# Patient Record
Sex: FEMALE | Race: WHITE | Employment: UNEMPLOYED | URBAN - METROPOLITAN AREA
[De-identification: names, ages, dates, MRNs, and addresses within clinical notes are randomized per-mention and may not be internally consistent; named-entity substitution may affect disease eponyms.]

---

## 2021-08-25 ENCOUNTER — HOSPITAL ENCOUNTER (OUTPATIENT)
Dept: MAMMOGRAPHY | Age: 36
Discharge: HOME | End: 2021-08-29
Payer: MEDICARE

## 2021-08-25 VITALS — HEIGHT: 70 IN | WEIGHT: 230 LBS | BODY MASS INDEX: 32.93 KG/M2

## 2021-08-25 DIAGNOSIS — Z12.31 VISIT FOR SCREENING MAMMOGRAM: ICD-10-CM

## 2021-08-25 PROCEDURE — 77067 SCR MAMMO BI INCL CAD: CPT

## 2021-08-30 DIAGNOSIS — R92.8 ABNORMAL MAMMOGRAM: Primary | ICD-10-CM

## 2021-09-08 ENCOUNTER — HOSPITAL ENCOUNTER (OUTPATIENT)
Dept: WOMENS IMAGING | Age: 36
Discharge: HOME | End: 2021-09-08
Payer: MEDICARE

## 2021-09-08 ENCOUNTER — HOSPITAL ENCOUNTER (OUTPATIENT)
Dept: ULTRASOUND IMAGING | Age: 36
Discharge: HOME | End: 2021-09-08
Payer: MEDICARE

## 2021-09-08 DIAGNOSIS — R92.8 ABNORMAL MAMMOGRAM: ICD-10-CM

## 2021-09-08 PROCEDURE — 76641 ULTRASOUND BREAST COMPLETE: CPT

## 2021-09-08 PROCEDURE — 77066 DX MAMMO INCL CAD BI: CPT

## 2022-03-31 DIAGNOSIS — Z12.31 ENCOUNTER FOR SCREENING MAMMOGRAM FOR BREAST CANCER: Primary | ICD-10-CM

## 2024-10-03 ENCOUNTER — HOSPITAL ENCOUNTER (EMERGENCY)
Age: 39
Discharge: HOME | End: 2024-10-03
Attending: EMERGENCY MEDICINE

## 2024-10-03 VITALS
SYSTOLIC BLOOD PRESSURE: 131 MMHG | HEIGHT: 70 IN | RESPIRATION RATE: 18 BRPM | TEMPERATURE: 97.8 F | HEART RATE: 84 BPM | WEIGHT: 232 LBS | OXYGEN SATURATION: 98 % | DIASTOLIC BLOOD PRESSURE: 89 MMHG | BODY MASS INDEX: 33.21 KG/M2

## 2024-10-03 DIAGNOSIS — Z32.02 NEGATIVE PREGNANCY TEST: ICD-10-CM

## 2024-10-03 DIAGNOSIS — R68.89 UNINTENTIONAL WEIGHT CHANGE: Primary | ICD-10-CM

## 2024-10-03 LAB
BACTERIA URNS QL MICRO: NORMAL /HPF
BILIRUB UR QL STRIP.AUTO: NEGATIVE
CLARITY UR: CLEAR
COLOR UR: YELLOW
EPI CELLS #/AREA URNS AUTO: 1 /HPF (ref 0–5)
GLUCOSE UR STRIP.AUTO-MCNC: NEGATIVE MG/DL
HCG UR QL: NEGATIVE
HGB UR QL STRIP.AUTO: ABNORMAL
HYALINE CASTS #/AREA URNS AUTO: 0 /LPF (ref 0–8)
KETONES UR STRIP.AUTO-MCNC: NEGATIVE MG/DL
LEUKOCYTE ESTERASE UR QL STRIP.AUTO: NEGATIVE
NITRITE UR QL STRIP.AUTO: NEGATIVE
PH UR STRIP.AUTO: 5.5 [PH] (ref 5–8)
PROT UR STRIP.AUTO-MCNC: NEGATIVE MG/DL
RBC CLUMPS #/AREA URNS AUTO: 1 /HPF (ref 0–4)
SP GR UR STRIP.AUTO: 1.02 (ref 1–1.03)
UA COMPLETE W REFLEX CULTURE PNL UR: ABNORMAL
UA DIPSTICK W REFLEX MICRO PNL UR: YES
URN SPEC COLLECT METH UR: ABNORMAL
UROBILINOGEN UR STRIP-ACNC: 1 E.U./DL
WBC #/AREA URNS AUTO: 4 /HPF (ref 0–5)

## 2024-10-03 PROCEDURE — 99283 EMERGENCY DEPT VISIT LOW MDM: CPT

## 2024-10-03 PROCEDURE — 84703 CHORIONIC GONADOTROPIN ASSAY: CPT

## 2024-10-03 PROCEDURE — 81001 URINALYSIS AUTO W/SCOPE: CPT

## 2024-10-03 ASSESSMENT — ENCOUNTER SYMPTOMS
SHORTNESS OF BREATH: 0
CHEST TIGHTNESS: 0
NAUSEA: 0
VOMITING: 0
ABDOMINAL PAIN: 0
DIARRHEA: 0

## 2024-10-03 ASSESSMENT — PAIN - FUNCTIONAL ASSESSMENT: PAIN_FUNCTIONAL_ASSESSMENT: NONE - DENIES PAIN

## 2024-10-03 ASSESSMENT — LIFESTYLE VARIABLES: HOW OFTEN DO YOU HAVE A DRINK CONTAINING ALCOHOL: NEVER

## 2024-10-03 NOTE — ED PROVIDER NOTES
I have personally performed a face to face diagnostic evaluation on this patient. I have fully participated in the care of this patient I personally saw the patient and performed a substantive portion of the visit including all aspects of the medical decision making.  I have reviewed and agree with all pertinent clinical information including history, physical exam, diagnostic tests, and the plan.      HPI: Katie Barnett presented with small amount of vaginal bleeding has some irregular periods no abdominal pain.  Patient is mostly here for pregnancy test.  Some concern for pregnancy patient's last full menstrual cycle she is not sure when it has been does not have an OB/GYN  Chief Complaint   Patient presents with    Vaginal Bleeding     Pt c/o vaginal bleeding that began last night. Pt states she has irregular periods but is worried she is pregnant due to weight gain in her abdominal area. Pt states she has not taken pregnancy test.       Review of Systems: See VENITA note  Vital Signs: /89   Pulse 84   Temp 97.8 °F (36.6 °C) (Oral)   Resp 18   Ht 1.778 m (5' 10\")   Wt 105.2 kg (232 lb)   SpO2 98%   BMI 33.29 kg/m²     Alert 38 y.o. female who does not appear toxic or acutely ill  HENT: Atraumatic, oral mucosa moist  Neck: Grossly normal ROM  Chest/Lungs: respiratory effort normal   Abdomen: Soft nontender  Musculoskeletal: Grossly normal ROM  Skin: No palor or diaphoresis    Medical Decision Making and Plan:  Pertinent Labs & Imaging studies reviewed. (See VENITA chart for details)  I agree with VENITA assessment and plan.  38-year-old female presents to the ER primarily for pregnancy test she has had some spotty irregular vaginal bleeding began last night she is not sure if it is a period or not.  She is not pregnant.  Urine does not appear infected she does not have any abdominal reproducible pain she does have some unintentional weight gain.  Patient is requesting referral to OB/GYN.  Will give her 1 at

## 2024-10-03 NOTE — ED PROVIDER NOTES
discussed)  None  Discussion with Other Profesionals : None    Social Determinants : Patient has significant healthcare illiteracy    Records Reviewed : Outpatient Notes postpartum exam 7/14/2021 Dr. Costello    CC/HPI Summary, DDx, ED Course, and Reassessment:     Briefly, this is a 38-year-old female accompanied by her male partner who presents to the emergency department with concern of pregnancy.  The patient reports that her abdomen is swollen and she has not yet taken a pregnancy test.  She reports that she did take a pregnancy test 4 months ago and that was negative.  She does have irregular periods, states that she has had mild vaginal bleeding today and her friend who is a nurse told her that she should see OB-states that she called for an appointment despite not taking a pregnancy test and there was a 4 to 5-week wait    The patient is not pregnant.  On exam, she does have an obese abdomen.  Pelvis was palpated, I do not appreciate a palpable fundus.    She is encouraged to follow-up with primary care as well as gynecology if she is having difficulty getting pregnant.    I see nothing that would suggest an acute abdomen at this time. Based on history, physical exam, risk factors, and tests my suspicion for bowel obstruction, incarcerated hernia, acute pancreatitis, intra-abdominal abscess, perforated viscus, diverticulitis, cholecystitis, appendicitis, PID, ovarian torsion, ectopic pregnancy and tubo-ovarian abscess is very low. There is no evidence of peritonitis, sepsis or toxicity at this time. I feel the patient can be managed as an outpatient with follow-up with her family doctor in 24-48 hours. Instructions have been given for the patient to return to the ED for worsening of the pain, high fevers, intractable vomiting, or bleeding.       Disposition Considerations (include 1 Tests not done, Shared Decision Making, Pt Expectation of Test or Tx.): Shared decision making: Initial differential diagnoses  were discussed with this patient, along with physical exam findings and an explanation what evaluation studies were necessary and why. Labs and Imaging results were explained to the patient in detail, including explanation of what these results mean. All treatment and disposition options were discussed with the patient and a treatment plan with the patient's best short and long term care was made in collaboration with the patient.    I did consider ultrasound abdomen    Appropriate for outpatient management follow up      I am the Primary Clinician of Record.    FINAL IMPRESSION      1. Unintentional weight change    2. Negative pregnancy test          DISPOSITION/PLAN     DISPOSITION Decision To Discharge 10/03/2024 08:45:57 PM  Condition at Disposition: Data Unavailable      PATIENT REFERRED TO:  Cha Gil MD  [address] [address]  [phone]    Schedule an appointment as soon as possible for a visit   As needed    Donnell Marmolejo MD  [address] [place] [address] [address] [address]  [phone]    Schedule an appointment as soon as possible for a visit       Donnell Marmolejo MD  [address] [place] [address] [address] [address]  [phone]            DISCHARGE MEDICATIONS:  There are no discharge medications for this patient.      DISCONTINUED MEDICATIONS:  There are no discharge medications for this patient.             (Please note that portions of this note were completed with a voice recognition program.  Efforts were made to edit the dictations but occasionally words are mis-transcribed.)    SUJATA Kennedy CNP (electronically signed)           Nicole Cox APRN - CNP  10/03/24 [id number]

## 2024-10-04 NOTE — DISCHARGE INSTRUCTIONS
Pregnancy test today is negative    Please see primary care for further workup regarding unintentional weight gain.  Please follow-up with gynecology if you are trying to get pregnant and have been unsuccessful